# Patient Record
Sex: FEMALE | Race: WHITE | Employment: FULL TIME | URBAN - METROPOLITAN AREA
[De-identification: names, ages, dates, MRNs, and addresses within clinical notes are randomized per-mention and may not be internally consistent; named-entity substitution may affect disease eponyms.]

---

## 2017-02-12 ENCOUNTER — VIRTUAL VISIT (OUTPATIENT)
Dept: FAMILY MEDICINE | Facility: OTHER | Age: 42
End: 2017-02-12

## 2017-02-12 NOTE — PROGRESS NOTES
Date:   Clinician: Deisi Glynn  Clinician NPI: [id number]  Patient: Agnieszka Samuel  Patient : 1975  Patient Address: [address] [place] [address]  Patient Phone: [phone]  Visit Protocol: URI  Patient Summary:  Agnieszka is a 41 year old ( : 1975 ) female who initiated a Zip for a presumed sinus infection.      Her symptoms started suddenly yesterday and consist of rhinitis, fever, malaise, nasal congestion, hoarse voice, loss of appetite, ear pain, myalgias, and post-nasal drainage.   She denies dyspnea, chills, sore throat, chest pain, cough, itchy eyes, vomiting, nausea, and dysphagia. She denies a history of facial surgery.   Her profuse nasal secretions are green and yellow. Her moderate facial pain or pressure feels worse when bending over or leaning forward and is located on one side of her head. The facial pain or pressure started after the onset of other URI symptoms.  Agnieszka has a moderate headache. The headache did not start before her other symptoms and is located on one side of her head. Agnieszka does not have a history of migraine headaches. When asked to feel her neck she reported enlarged lymph nodes. Agnieszka noted that the enlarged neck lymph nodes were first noticed when prompted to check for lymphadenopathy. She cannot tell if axillary lymphadenopathy is present.   Regarding the ear pain, the patient denies pain if the mouth is fully open or teeth are clenched, experiencing pain when gently pulling on the earlobe, and recent injury to the area around the ear.   She reports having mild ear pain on the ear canal area of the right ear for 1 day. The patient hears normally despite the ear pain.   In addition to the ear pain, Agnieszka also reports having the following symptoms:      A feeling of fullness in the ear as if it is clogged    Tinnitus     Agnieszka denies having tenderness, swelling, and redness on her ear.   Additionally, she does not experience pain when bending the chin  to the chest.   She has never had tympanostomy tube placement.    She has passed urine in the past 12 hours.   Agnieszka denies having COPD or other chronic lung disease.   Pulse: Not measured beats in 10 seconds.    Weight (in lbs): 150   She states she is not pregnant and denies breastfeeding. She has menstruated in the past month.   Agnieszka does not smoke or use smokeless tobacco.   MEDICATIONS:  Phenylephrine (Sudafed PE) and ibuprofen (Advil, Motrin)   Patient free text response:  Valacyclovir  , ALLERGIES:  NKDA   Clinician Response:  Dear Agnieszka,  Based on the information you have provided, you likely have a viral upper respiratory infection, otherwise known as a 'cold'.   Sinus pressure occurs when the tissues lining your sinuses become swollen and inflamed. Afrin nasal spray decreases the swelling to provide the quickest and most effective relief from sinus pressure. Use oxymetazoline (Afrin, or store brand) nasal spray. Spray once in each nostril twice per day for a maximum of 3 days. Using this medication more frequently or longer than recommended may cause nasal congestion to reoccur or worsen. This is an over-the-counter medication you can find at most pharmacies.   I am also prescribing Flonase nasal spray. Flonase will also help reduce swelling in your sinuses, but it works differently than Afrin, so use both nasal sprays. Spray the Flonase twice (2 times) in each nostril every day at approximately the same time each day until you feel better. This medication takes several days to start working, so keep taking it if it doesn't help right away.   Unless your are allergic to the over-the-counter medication(s) below, I recommend using:   An antihistamine like Benadryl, Claritin or store brand. This is an over-the-counter medication that does not require a prescription.   A decongestant such as pseudoephedrine (Sudafed or store brand) to help your symptoms. This is an over-the-counter medication that does not require  a prescription.   Because your condition is most likely caused by a virus, antibiotics will not help you get better. Inappropriately treating a viral infection with antibiotics may cause harmful side-effects. In fact, antibiotics may make you feel worse.  For more information on why I am not prescribing antibiotics, please watch this video: Antibiotics Won't Help You.   Your symptoms are consistent with a possible sinus infection. Most sinus infections are caused by viruses and will resolve in the next few days without antibiotics. Antibiotics are only recommended if your sinus infection is accompanied by a high temperature or persists longer than 10 days.  The duration of your sinus symptoms do not meet the criteria for a bacterial infection or antibiotic treatment. However, if you continue to have sinus pain and pressure longer than 10 days, please consider doing another visit with us for additional evaluation and treatment recommendations.   Drink plenty of liquids, especially water and take time to rest your body. This may mean taking a nap or going to bed earlier. Your body is fighting an infection and liquids and rest will improve the pace of recovery. Remember to regularly wash your hands and avoid close contact with others to prevent spreading your infection.  I am providing you with a promo code for a free visit. This code will  in two weeks and may only be used once. Please redeem your free visit by entering the following promo code on the payment screen: [id number]   Diagnosis: Viral URI  Diagnosis ICD: J06.9  Prescription: fluticasone propionate (Flonase) 50mcg nasal spray 16 gm, 30 days supply. Take one or two inhalations in each nostril one time a day. Refills: 0, Refill as needed: no, Allow substitutions: yes

## 2017-02-15 ENCOUNTER — TELEPHONE (OUTPATIENT)
Dept: OTHER | Facility: CLINIC | Age: 42
End: 2017-02-15

## 2017-02-15 NOTE — TELEPHONE ENCOUNTER
2/15/2017    Call Regarding [employer]    Attempt 1    Message on voicemail     Comments: NO DEP      Outreach   CC

## 2017-03-08 NOTE — TELEPHONE ENCOUNTER
3/8/2017    Call Regarding [employer]    Attempt 2    Message on voicemail     Comments:           Outreach   hillary

## 2017-03-30 NOTE — TELEPHONE ENCOUNTER
3/30/2017    Call Regarding [employer]    Attempt 3    Message on voicemail     Comments:       Outreach   KV

## 2017-10-15 ENCOUNTER — HEALTH MAINTENANCE LETTER (OUTPATIENT)
Age: 42
End: 2017-10-15

## 2020-02-24 ENCOUNTER — HEALTH MAINTENANCE LETTER (OUTPATIENT)
Age: 45
End: 2020-02-24

## 2020-12-13 ENCOUNTER — HEALTH MAINTENANCE LETTER (OUTPATIENT)
Age: 45
End: 2020-12-13

## 2021-02-21 ENCOUNTER — HEALTH MAINTENANCE LETTER (OUTPATIENT)
Age: 46
End: 2021-02-21

## 2021-04-17 ENCOUNTER — HEALTH MAINTENANCE LETTER (OUTPATIENT)
Age: 46
End: 2021-04-17

## 2021-09-26 ENCOUNTER — HEALTH MAINTENANCE LETTER (OUTPATIENT)
Age: 46
End: 2021-09-26

## 2022-03-13 ENCOUNTER — HEALTH MAINTENANCE LETTER (OUTPATIENT)
Age: 47
End: 2022-03-13

## 2022-05-08 ENCOUNTER — HEALTH MAINTENANCE LETTER (OUTPATIENT)
Age: 47
End: 2022-05-08

## 2023-04-23 ENCOUNTER — HEALTH MAINTENANCE LETTER (OUTPATIENT)
Age: 48
End: 2023-04-23

## 2023-06-02 ENCOUNTER — HEALTH MAINTENANCE LETTER (OUTPATIENT)
Age: 48
End: 2023-06-02